# Patient Record
Sex: MALE | Race: ASIAN | NOT HISPANIC OR LATINO | URBAN - METROPOLITAN AREA
[De-identification: names, ages, dates, MRNs, and addresses within clinical notes are randomized per-mention and may not be internally consistent; named-entity substitution may affect disease eponyms.]

---

## 2023-07-08 ENCOUNTER — EMERGENCY (EMERGENCY)
Facility: HOSPITAL | Age: 43
LOS: 1 days | Discharge: HOME | End: 2023-07-08
Attending: EMERGENCY MEDICINE
Payer: COMMERCIAL

## 2023-07-08 VITALS
RESPIRATION RATE: 16 BRPM | TEMPERATURE: 98 F | WEIGHT: 190.04 LBS | OXYGEN SATURATION: 95 % | DIASTOLIC BLOOD PRESSURE: 60 MMHG | HEIGHT: 69 IN | HEART RATE: 81 BPM | SYSTOLIC BLOOD PRESSURE: 88 MMHG

## 2023-07-08 VITALS
OXYGEN SATURATION: 100 % | RESPIRATION RATE: 17 BRPM | DIASTOLIC BLOOD PRESSURE: 69 MMHG | SYSTOLIC BLOOD PRESSURE: 103 MMHG | HEART RATE: 68 BPM

## 2023-07-08 LAB
ALBUMIN SERPL ELPH-MCNC: 4.3 G/DL — SIGNIFICANT CHANGE UP (ref 3.3–5)
ALP SERPL-CCNC: 61 U/L — SIGNIFICANT CHANGE UP (ref 40–120)
ALT FLD-CCNC: 21 U/L — SIGNIFICANT CHANGE UP (ref 10–45)
ANION GAP SERPL CALC-SCNC: 15 MMOL/L — SIGNIFICANT CHANGE UP (ref 5–17)
APTT BLD: 26.5 SEC — LOW (ref 27.5–35.5)
AST SERPL-CCNC: 22 U/L — SIGNIFICANT CHANGE UP (ref 10–40)
BASE EXCESS BLDV CALC-SCNC: -0.9 MMOL/L — SIGNIFICANT CHANGE UP (ref -2–3)
BASOPHILS # BLD AUTO: 0.04 K/UL — SIGNIFICANT CHANGE UP (ref 0–0.2)
BASOPHILS NFR BLD AUTO: 0.8 % — SIGNIFICANT CHANGE UP (ref 0–2)
BILIRUB SERPL-MCNC: 0.8 MG/DL — SIGNIFICANT CHANGE UP (ref 0.2–1.2)
BUN SERPL-MCNC: 13 MG/DL — SIGNIFICANT CHANGE UP (ref 7–23)
CA-I SERPL-SCNC: 1.21 MMOL/L — SIGNIFICANT CHANGE UP (ref 1.15–1.33)
CALCIUM SERPL-MCNC: 9 MG/DL — SIGNIFICANT CHANGE UP (ref 8.4–10.5)
CHLORIDE BLDV-SCNC: 107 MMOL/L — SIGNIFICANT CHANGE UP (ref 96–108)
CHLORIDE SERPL-SCNC: 108 MMOL/L — SIGNIFICANT CHANGE UP (ref 96–108)
CO2 BLDV-SCNC: 27 MMOL/L — HIGH (ref 22–26)
CO2 SERPL-SCNC: 18 MMOL/L — LOW (ref 22–31)
CREAT SERPL-MCNC: 0.75 MG/DL — SIGNIFICANT CHANGE UP (ref 0.5–1.3)
EGFR: 116 ML/MIN/1.73M2 — SIGNIFICANT CHANGE UP
EOSINOPHIL # BLD AUTO: 0.14 K/UL — SIGNIFICANT CHANGE UP (ref 0–0.5)
EOSINOPHIL NFR BLD AUTO: 2.8 % — SIGNIFICANT CHANGE UP (ref 0–6)
ETHANOL SERPL-MCNC: <10 MG/DL — SIGNIFICANT CHANGE UP (ref 0–10)
GAS PNL BLDV: 137 MMOL/L — SIGNIFICANT CHANGE UP (ref 136–145)
GAS PNL BLDV: SIGNIFICANT CHANGE UP
GLUCOSE BLDV-MCNC: 106 MG/DL — HIGH (ref 70–99)
GLUCOSE SERPL-MCNC: 107 MG/DL — HIGH (ref 70–99)
HCO3 BLDV-SCNC: 25 MMOL/L — SIGNIFICANT CHANGE UP (ref 22–29)
HCT VFR BLD CALC: 40.6 % — SIGNIFICANT CHANGE UP (ref 39–50)
HCT VFR BLDA CALC: 44 % — SIGNIFICANT CHANGE UP (ref 39–51)
HGB BLD CALC-MCNC: 14.7 G/DL — SIGNIFICANT CHANGE UP (ref 12.6–17.4)
HGB BLD-MCNC: 14.1 G/DL — SIGNIFICANT CHANGE UP (ref 13–17)
IMM GRANULOCYTES NFR BLD AUTO: 0.4 % — SIGNIFICANT CHANGE UP (ref 0–0.9)
INR BLD: 1.03 RATIO — SIGNIFICANT CHANGE UP (ref 0.88–1.16)
LACTATE BLDV-MCNC: 1.1 MMOL/L — SIGNIFICANT CHANGE UP (ref 0.5–2)
LIDOCAIN IGE QN: 28 U/L — SIGNIFICANT CHANGE UP (ref 7–60)
LYMPHOCYTES # BLD AUTO: 2.05 K/UL — SIGNIFICANT CHANGE UP (ref 1–3.3)
LYMPHOCYTES # BLD AUTO: 40.8 % — SIGNIFICANT CHANGE UP (ref 13–44)
MCHC RBC-ENTMCNC: 32.4 PG — SIGNIFICANT CHANGE UP (ref 27–34)
MCHC RBC-ENTMCNC: 34.7 GM/DL — SIGNIFICANT CHANGE UP (ref 32–36)
MCV RBC AUTO: 93.3 FL — SIGNIFICANT CHANGE UP (ref 80–100)
MONOCYTES # BLD AUTO: 0.39 K/UL — SIGNIFICANT CHANGE UP (ref 0–0.9)
MONOCYTES NFR BLD AUTO: 7.8 % — SIGNIFICANT CHANGE UP (ref 2–14)
NEUTROPHILS # BLD AUTO: 2.38 K/UL — SIGNIFICANT CHANGE UP (ref 1.8–7.4)
NEUTROPHILS NFR BLD AUTO: 47.4 % — SIGNIFICANT CHANGE UP (ref 43–77)
NRBC # BLD: 0 /100 WBCS — SIGNIFICANT CHANGE UP (ref 0–0)
PCO2 BLDV: 47 MMHG — SIGNIFICANT CHANGE UP (ref 42–55)
PH BLDV: 7.34 — SIGNIFICANT CHANGE UP (ref 7.32–7.43)
PLATELET # BLD AUTO: 203 K/UL — SIGNIFICANT CHANGE UP (ref 150–400)
PO2 BLDV: 47 MMHG — HIGH (ref 25–45)
POTASSIUM BLDV-SCNC: 3.8 MMOL/L — SIGNIFICANT CHANGE UP (ref 3.5–5.1)
POTASSIUM SERPL-MCNC: 4.4 MMOL/L — SIGNIFICANT CHANGE UP (ref 3.5–5.3)
POTASSIUM SERPL-SCNC: 4.4 MMOL/L — SIGNIFICANT CHANGE UP (ref 3.5–5.3)
PROT SERPL-MCNC: 7 G/DL — SIGNIFICANT CHANGE UP (ref 6–8.3)
PROTHROM AB SERPL-ACNC: 11.9 SEC — SIGNIFICANT CHANGE UP (ref 10.5–13.4)
RBC # BLD: 4.35 M/UL — SIGNIFICANT CHANGE UP (ref 4.2–5.8)
RBC # FLD: 11.7 % — SIGNIFICANT CHANGE UP (ref 10.3–14.5)
SAO2 % BLDV: 74.6 % — SIGNIFICANT CHANGE UP (ref 67–88)
SODIUM SERPL-SCNC: 141 MMOL/L — SIGNIFICANT CHANGE UP (ref 135–145)
WBC # BLD: 5.02 K/UL — SIGNIFICANT CHANGE UP (ref 3.8–10.5)
WBC # FLD AUTO: 5.02 K/UL — SIGNIFICANT CHANGE UP (ref 3.8–10.5)

## 2023-07-08 PROCEDURE — 73610 X-RAY EXAM OF ANKLE: CPT | Mod: 26,RT,76

## 2023-07-08 PROCEDURE — 85025 COMPLETE CBC W/AUTO DIFF WBC: CPT

## 2023-07-08 PROCEDURE — 84132 ASSAY OF SERUM POTASSIUM: CPT

## 2023-07-08 PROCEDURE — 83605 ASSAY OF LACTIC ACID: CPT

## 2023-07-08 PROCEDURE — 73630 X-RAY EXAM OF FOOT: CPT

## 2023-07-08 PROCEDURE — 82330 ASSAY OF CALCIUM: CPT

## 2023-07-08 PROCEDURE — 80053 COMPREHEN METABOLIC PANEL: CPT

## 2023-07-08 PROCEDURE — 86850 RBC ANTIBODY SCREEN: CPT

## 2023-07-08 PROCEDURE — 80307 DRUG TEST PRSMV CHEM ANLYZR: CPT

## 2023-07-08 PROCEDURE — 85018 HEMOGLOBIN: CPT

## 2023-07-08 PROCEDURE — 71260 CT THORAX DX C+: CPT | Mod: MA

## 2023-07-08 PROCEDURE — 72131 CT LUMBAR SPINE W/O DYE: CPT | Mod: 26,MA

## 2023-07-08 PROCEDURE — 85014 HEMATOCRIT: CPT

## 2023-07-08 PROCEDURE — 86901 BLOOD TYPING SEROLOGIC RH(D): CPT

## 2023-07-08 PROCEDURE — 82947 ASSAY GLUCOSE BLOOD QUANT: CPT

## 2023-07-08 PROCEDURE — 73610 X-RAY EXAM OF ANKLE: CPT

## 2023-07-08 PROCEDURE — 72125 CT NECK SPINE W/O DYE: CPT | Mod: 26,MA

## 2023-07-08 PROCEDURE — 73630 X-RAY EXAM OF FOOT: CPT | Mod: 26,LT

## 2023-07-08 PROCEDURE — 36415 COLL VENOUS BLD VENIPUNCTURE: CPT

## 2023-07-08 PROCEDURE — 71260 CT THORAX DX C+: CPT | Mod: 26,MA

## 2023-07-08 PROCEDURE — 85730 THROMBOPLASTIN TIME PARTIAL: CPT

## 2023-07-08 PROCEDURE — 96375 TX/PRO/DX INJ NEW DRUG ADDON: CPT | Mod: XU

## 2023-07-08 PROCEDURE — 76705 ECHO EXAM OF ABDOMEN: CPT

## 2023-07-08 PROCEDURE — 70450 CT HEAD/BRAIN W/O DYE: CPT | Mod: 26,MA

## 2023-07-08 PROCEDURE — 74177 CT ABD & PELVIS W/CONTRAST: CPT | Mod: MA

## 2023-07-08 PROCEDURE — 85610 PROTHROMBIN TIME: CPT

## 2023-07-08 PROCEDURE — 84295 ASSAY OF SERUM SODIUM: CPT

## 2023-07-08 PROCEDURE — 99285 EMERGENCY DEPT VISIT HI MDM: CPT

## 2023-07-08 PROCEDURE — 99284 EMERGENCY DEPT VISIT MOD MDM: CPT | Mod: 25

## 2023-07-08 PROCEDURE — 82803 BLOOD GASES ANY COMBINATION: CPT

## 2023-07-08 PROCEDURE — 72125 CT NECK SPINE W/O DYE: CPT | Mod: MA

## 2023-07-08 PROCEDURE — 86900 BLOOD TYPING SEROLOGIC ABO: CPT

## 2023-07-08 PROCEDURE — 83690 ASSAY OF LIPASE: CPT

## 2023-07-08 PROCEDURE — 96374 THER/PROPH/DIAG INJ IV PUSH: CPT | Mod: XU

## 2023-07-08 PROCEDURE — 70450 CT HEAD/BRAIN W/O DYE: CPT | Mod: MA

## 2023-07-08 PROCEDURE — 74177 CT ABD & PELVIS W/CONTRAST: CPT | Mod: 26,MA

## 2023-07-08 PROCEDURE — 82435 ASSAY OF BLOOD CHLORIDE: CPT

## 2023-07-08 RX ORDER — LIDOCAINE 4 G/100G
1 CREAM TOPICAL ONCE
Refills: 0 | Status: COMPLETED | OUTPATIENT
Start: 2023-07-08 | End: 2023-07-08

## 2023-07-08 RX ORDER — SODIUM CHLORIDE 9 MG/ML
250 INJECTION INTRAMUSCULAR; INTRAVENOUS; SUBCUTANEOUS ONCE
Refills: 0 | Status: COMPLETED | OUTPATIENT
Start: 2023-07-08 | End: 2023-07-08

## 2023-07-08 RX ORDER — KETOROLAC TROMETHAMINE 30 MG/ML
15 SYRINGE (ML) INJECTION ONCE
Refills: 0 | Status: DISCONTINUED | OUTPATIENT
Start: 2023-07-08 | End: 2023-07-08

## 2023-07-08 RX ORDER — ACETAMINOPHEN 500 MG
1000 TABLET ORAL ONCE
Refills: 0 | Status: COMPLETED | OUTPATIENT
Start: 2023-07-08 | End: 2023-07-08

## 2023-07-08 RX ADMIN — SODIUM CHLORIDE 250 MILLILITER(S): 9 INJECTION INTRAMUSCULAR; INTRAVENOUS; SUBCUTANEOUS at 07:19

## 2023-07-08 RX ADMIN — Medication 15 MILLIGRAM(S): at 08:07

## 2023-07-08 RX ADMIN — Medication 400 MILLIGRAM(S): at 08:07

## 2023-07-08 RX ADMIN — LIDOCAINE 1 PATCH: 4 CREAM TOPICAL at 08:07

## 2023-07-08 NOTE — ED ADULT NURSE REASSESSMENT NOTE - NS ED NURSE REASSESS COMMENT FT1
Pt AAOx4, NAD, resting comfortably in bed with spouse at bedside. Pt denies headache, dizziness, chest pain, cough, SOB, abdominal pain, n/v/d, urinary symptoms, fevers, chills, weakness at this time. Pt discharged as per MD, IV removed as per MD Subramanian, pt ambulated independently out of ED.

## 2023-07-08 NOTE — ED ADULT NURSE REASSESSMENT NOTE - NS ED NURSE REASSESS COMMENT FT1
Report received from Camilo. Pt AAOx4, NAD, resp nonlabored, skin warm/dry, resting comfortably in bed. Pt c/o back pain. Pt denies headache, dizziness, chest pain, palpitations, SOB, abd pain, n/v/d, urinary symptoms, fevers, chills, weakness at this time. Pt awaiting CT results. Safety maintained.

## 2023-07-08 NOTE — ED PROVIDER NOTE - NSFOLLOWUPINSTRUCTIONS_ED_ALL_ED_FT
You were seen in the Emergency Department for fall.     1) Advance activity as tolerated.   2) Continue all previously prescribed medications as directed. Take acetaminophen 975mg and ibuprofen 400mg every 6-8 hours as needed for pain.   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    4) Return to the Emergency Department for worsening or persistent symptoms, and/or ANY NEW OR CONCERNING SYMPTOMS.    SEEK IMMEDIATE MEDICAL CARE IF YOU HAVE ANY OF THE FOLLOWING SYMPTOMS: numbness, tingling, increasing pain, or weakness in any part of the injured limb.

## 2023-07-08 NOTE — ED ADULT NURSE NOTE - OBJECTIVE STATEMENT
43 yo M AOx4 from home with no pertinent PMH c/o left heel and back pain. Pt states he slipped and fell off a 6 and a half foot ladder and landed on a wooden floor. Pt unsure what body part he landed on, but denies LOC and confirms ability to get up by self "with a lot of pain". Pt endorses use of 2 Tylenol that provided no relief. Pt endorses dizziness and nausea this am. Pt PERRL. Neuro exam intact with PMS and full ROM x 4 extremities. No slurred speech and facial droop noted. Pt initially presents to [facility] ED in no acute distress with unlabored breathing. Pt abdomen soft and nondistended. Call bell in reach. Stretcher in lowest position locked with blanket given. Comfort care and safety measures provided. Pt denies c/p, sob, abd pain, N/V/D, fevers, chills, dysuria, blood in urine and stool. 41 yo M AOx4 from home with no pertinent PMH c/o left heel and back pain. Pt states he slipped and fell off a 6 and a half foot ladder and landed on a wooden floor. Pt unsure what body part he landed on, but denies LOC and confirms ability to get up by self "with a lot of pain". Pt endorses use of 2 Tylenol that provided no relief. Pt endorses dizziness and nausea "felt like I needed to vomit but didn't" this am. Pt PERRL. Neuro exam intact with PMS and full ROM x 4 extremities. No slurred speech and facial droop noted. Pt initially presents to [facility] ED in no acute distress with unlabored breathing. Pt abdomen soft and nondistended. Call bell in reach. Stretcher in lowest position locked with blanket given. Comfort care and safety measures provided. Pt denies c/p, sob, abd pain, V/D, fevers, chills, dysuria, blood in urine and stool.

## 2023-07-08 NOTE — ED PROVIDER NOTE - PATIENT PORTAL LINK FT
You can access the FollowMyHealth Patient Portal offered by [facility] by registering at the following website: [email or web address]. By joining [facility]’s FollowMyHealth portal, you will also be able to view your health information using other applications (apps) compatible with our system.

## 2023-07-08 NOTE — ED ADULT NURSE NOTE - NSFALLUNIVINTERV_ED_ALL_ED
Bed/Stretcher in lowest position, wheels locked, appropriate side rails in place/Call bell, personal items and telephone in reach/Instruct patient to call for assistance before getting out of bed/chair/stretcher/Non-slip footwear applied when patient is off stretcher/[place] to call system/Physically safe environment - no spills, clutter or unnecessary equipment/Purposeful proactive rounding/Room/bathroom lighting operational, light cord in reach

## 2023-07-08 NOTE — ED PROVIDER NOTE - CARE PLAN
1 Principal Discharge DX:	Back pain   Principal Discharge DX:	Back pain  Secondary Diagnosis:	Foot pain, left

## 2023-07-08 NOTE — ED ADULT TRIAGE NOTE - NS ED TRIAGE AVPU SCALE
Normal for race
Alert-The patient is alert, awake and responds to voice. The patient is oriented to time, place, and person. The triage nurse is able to obtain subjective information.

## 2023-07-08 NOTE — ED PROVIDER NOTE - PROGRESS NOTE DETAILS
Moris: Patient signed out to f/u imaging, xray, pain control. Patient still complainig of pain to lumbar spine. CTs done and negative for intrathoracic/abdominal injuries. will order ct lumbar recon.  will give tylenol/toradol for pain, reassess. Patient ambulated without difficulty.  Placed Ace wrap for comfort on foot. Improvement on symptoms. Informed patient of incidental finding of Paget Schroetter syndrome seen on CT and informed patient to follow-up with vascular surgery   outpatient for findings.  Spoke to patient/family about results including other incidental findings. Plan to discharge patient. Patient given  vascular surgery and PCP follow up and return precautions. Patient/family agrees with plan.

## 2023-07-08 NOTE — ED PROVIDER NOTE - ATTENDING CONTRIBUTION TO CARE
Dr. Asencio (Attending Physician)  I performed a history and physical exam of the patient and discussed their management with the resident. I reviewed the resident's note and agree with the documented findings and plan of care. My medical decision making and observations are found above.

## 2023-07-08 NOTE — ED PROVIDER NOTE - CLINICAL SUMMARY MEDICAL DECISION MAKING FREE TEXT BOX
Cordelia Wyatt DO PGY-3  42-year-old male with no past past medical history, not on anticoagulation presents to the ED after a fall off of a ladder that was 6 feet tall.  Patient had a mechanical slip and fall at 7 PM yesterday where he fell backwards and landed on his left foot.  States that he had some immediate pain was able to ambulate, but gradually started experiencing severe back pain and left heel pain.  Denies numbness, tingling, weakness.  Denies neck pain, loss of consciousness or headache.  Pt initially hypotensive to 90s/60s which immediately responded to IVF. GCS 15, Abcde's intact, E-FAST negative. No midline spinal tenderness or step-offs except for the L3-L4 region midline and paraspinally  Where he has some pain with palpation.  Patient is neurovascularly intact.  Will evaluate for fractures, RP bleed. Plan to obtain CTs, XRs, labs, re-assess. Cordelia Wyatt DO PGY-3  42-year-old male with no past past medical history, not on anticoagulation presents to the ED after a fall off of a ladder that was 6 feet tall.  Patient had a mechanical slip and fall at 7 PM yesterday where he fell backwards and landed on his left foot.  States that he had some immediate pain was able to ambulate, but gradually started experiencing severe back pain and left heel pain.  Denies numbness, tingling, weakness.  Denies neck pain, loss of consciousness or headache.  Pt initially hypotensive to 90s/60s which immediately responded to IVF. GCS 15, Abcde's intact, E-FAST negative. No midline spinal tenderness or step-offs except for the L3-L4 region midline and paraspinally  Where he has some pain with palpation.  Patient is neurovascularly intact.  Will evaluate for fractures, RP bleed. Plan to obtain CTs, XRs, labs, re-assess.    Dr. Asencio (Attending Physician)

## 2023-07-10 PROCEDURE — 76705 ECHO EXAM OF ABDOMEN: CPT | Mod: 26

## 2024-09-07 ENCOUNTER — APPOINTMENT (OUTPATIENT)
Dept: INTERNAL MEDICINE | Facility: CLINIC | Age: 44
End: 2024-09-07
Payer: COMMERCIAL

## 2024-09-07 VITALS
TEMPERATURE: 97.2 F | OXYGEN SATURATION: 98 % | HEART RATE: 77 BPM | DIASTOLIC BLOOD PRESSURE: 69 MMHG | HEIGHT: 68 IN | WEIGHT: 162 LBS | BODY MASS INDEX: 24.55 KG/M2 | SYSTOLIC BLOOD PRESSURE: 103 MMHG

## 2024-09-07 DIAGNOSIS — Z80.3 FAMILY HISTORY OF MALIGNANT NEOPLASM OF BREAST: ICD-10-CM

## 2024-09-07 DIAGNOSIS — Z11.59 ENCOUNTER FOR SCREENING FOR OTHER VIRAL DISEASES: ICD-10-CM

## 2024-09-07 DIAGNOSIS — Z00.00 ENCOUNTER FOR GENERAL ADULT MEDICAL EXAMINATION W/OUT ABNORMAL FINDINGS: ICD-10-CM

## 2024-09-07 DIAGNOSIS — Z11.4 ENCOUNTER FOR SCREENING FOR HUMAN IMMUNODEFICIENCY VIRUS [HIV]: ICD-10-CM

## 2024-09-07 PROCEDURE — 93000 ELECTROCARDIOGRAM COMPLETE: CPT

## 2024-09-07 PROCEDURE — 99386 PREV VISIT NEW AGE 40-64: CPT

## 2024-09-07 PROCEDURE — 36415 COLL VENOUS BLD VENIPUNCTURE: CPT

## 2024-09-07 NOTE — HISTORY OF PRESENT ILLNESS
[FreeTextEntry1] : CPE as new pt evaluation [de-identified] : PMH fatty liver PSH sinus surgery during childhood No significant complaints.

## 2024-09-07 NOTE — PHYSICAL EXAM
[No Acute Distress] : no acute distress [Well Nourished] : well nourished [Well Developed] : well developed [Well-Appearing] : well-appearing [Normal Sclera/Conjunctiva] : normal sclera/conjunctiva [PERRL] : pupils equal round and reactive to light [EOMI] : extraocular movements intact [Normal Outer Ear/Nose] : the outer ears and nose were normal in appearance [No JVD] : no jugular venous distention [No Lymphadenopathy] : no lymphadenopathy [Supple] : supple [Thyroid Normal, No Nodules] : the thyroid was normal and there were no nodules present [No Respiratory Distress] : no respiratory distress  [No Accessory Muscle Use] : no accessory muscle use [Clear to Auscultation] : lungs were clear to auscultation bilaterally [Normal Rate] : normal rate  [Regular Rhythm] : with a regular rhythm [Normal S1, S2] : normal S1 and S2 [No Murmur] : no murmur heard [No Edema] : there was no peripheral edema [Soft] : abdomen soft [No Extremity Clubbing/Cyanosis] : no extremity clubbing/cyanosis [Non Tender] : non-tender [Non-distended] : non-distended [No Masses] : no abdominal mass palpated [Normal Anterior Cervical Nodes] : no anterior cervical lymphadenopathy [Grossly Normal Strength/Tone] : grossly normal strength/tone [Coordination Grossly Intact] : coordination grossly intact [Normal Gait] : normal gait [Alert and Oriented x3] : oriented to person, place, and time [Normal Affect] : the affect was normal [Normal Insight/Judgement] : insight and judgment were intact

## 2024-09-07 NOTE — PLAN
[FreeTextEntry1] :  HCM )  follow w/ dental, ophthalmology/optometry as recommended ophthalmology referral given to pt EKG done for HCM at this office today;  NSR no acute ST-T changes Lab ( venipuncture ) done today at this office Diet low calorie low fat regular exercises recommended hep B/C screening  fatigue check lab, observe clinically  observe closely clinically and RTC if persistent or worsening S/S  Frequent loose stools, possibly IBS, S/P neg colonoscopies in [place]  observe closely clinically and RTC if persistent or worsening S/S, will refer to colonoscopy

## 2024-09-07 NOTE — REVIEW OF SYSTEMS
[Fatigue] : fatigue [Negative] : Heme/Lymph [FreeTextEntry7] : chronic frequent loose stools, S/P multiple neg colonoscopy in [place]

## 2024-09-07 NOTE — HEALTH RISK ASSESSMENT
[Very Good] : ~his/her~  mood as very good [Yes] : Yes [Monthly or less (1 pt)] : Monthly or less (1 point) [1 or 2 (0 pts)] : 1 or 2 (0 points) [No] : In the past 12 months have you used drugs other than those required for medical reasons? No [No falls in past year] : Patient reported no falls in the past year [0] : 2) Feeling down, depressed, or hopeless: Not at all (0) [PHQ-2 Negative - No further assessment needed] : PHQ-2 Negative - No further assessment needed [Never] : Never [Patient reported colonoscopy was normal] : Patient reported colonoscopy was normal [HIV test declined] : HIV test declined [Hepatitis C test offered] : Hepatitis C test offered [None] : None [With Family] : lives with family [Employed] : employed [] :  [# Of Children ___] : has [unfilled] children [Feels Safe at Home] : Feels safe at home [Fully functional (bathing, dressing, toileting, transferring, walking, feeding)] : Fully functional (bathing, dressing, toileting, transferring, walking, feeding) [Fully functional (using the telephone, shopping, preparing meals, housekeeping, doing laundry, using] : Fully functional and needs no help or supervision to perform IADLs (using the telephone, shopping, preparing meals, housekeeping, doing laundry, using transportation, managing medications and managing finances) [Reports normal functional visual acuity (ie: able to read med bottle)] : Reports normal functional visual acuity [Seat Belt] :  uses seat belt [de-identified] : sedentary [de-identified] : regular [[id number]] : 0 [EyeExamDate] : ? [Change in mental status noted] : No change in mental status noted [Language] : denies difficulty with language [Behavior] : denies difficulty with behavior [High Risk Behavior] : no high risk behavior [Reports changes in hearing] : Reports no changes in hearing [Reports changes in vision] : Reports no changes in vision [Reports changes in dental health] : Reports no changes in dental health [ColonoscopyDate] : 2021

## 2024-09-10 DIAGNOSIS — R80.9 PROTEINURIA, UNSPECIFIED: ICD-10-CM

## 2024-09-10 LAB
ALBUMIN SERPL ELPH-MCNC: 4.8 G/DL
ALP BLD-CCNC: 67 U/L
ALT SERPL-CCNC: 21 U/L
ANION GAP SERPL CALC-SCNC: 10 MMOL/L
APPEARANCE: CLEAR
AST SERPL-CCNC: 21 U/L
BACTERIA: NEGATIVE /HPF
BILIRUB SERPL-MCNC: 0.6 MG/DL
BILIRUBIN URINE: NEGATIVE
BLOOD URINE: NEGATIVE
BUN SERPL-MCNC: 9 MG/DL
CALCIUM SERPL-MCNC: 9.7 MG/DL
CAST: 1 /LPF
CHLORIDE SERPL-SCNC: 105 MMOL/L
CHOLEST SERPL-MCNC: 174 MG/DL
CO2 SERPL-SCNC: 25 MMOL/L
COLOR: YELLOW
CREAT SERPL-MCNC: 0.9 MG/DL
EGFR: 109 ML/MIN/1.73M2
EPITHELIAL CELLS: 0 /HPF
GLUCOSE QUALITATIVE U: NEGATIVE MG/DL
GLUCOSE SERPL-MCNC: 91 MG/DL
HBV SURFACE AG SER QL: NONREACTIVE
HCT VFR BLD CALC: 43 %
HCV AB SER QL: NONREACTIVE
HCV S/CO RATIO: 0.47 S/CO
HDLC SERPL-MCNC: 40 MG/DL
HGB BLD-MCNC: 14.7 G/DL
KETONES URINE: NEGATIVE MG/DL
LDLC SERPL CALC-MCNC: 117 MG/DL
LEUKOCYTE ESTERASE URINE: NEGATIVE
MCHC RBC-ENTMCNC: 32.3 PG
MCHC RBC-ENTMCNC: 34.2 GM/DL
MCV RBC AUTO: 94.5 FL
MICROSCOPIC-UA: NORMAL
NITRITE URINE: NEGATIVE
NONHDLC SERPL-MCNC: 134 MG/DL
PH URINE: 7
PLATELET # BLD AUTO: 246 K/UL
POTASSIUM SERPL-SCNC: 4.5 MMOL/L
PROT SERPL-MCNC: 7.7 G/DL
PROTEIN URINE: 100 MG/DL
RBC # BLD: 4.55 M/UL
RBC # FLD: 11.9 %
RED BLOOD CELLS URINE: 1 /HPF
SODIUM SERPL-SCNC: 140 MMOL/L
SPECIFIC GRAVITY URINE: 1.02
TRIGL SERPL-MCNC: 96 MG/DL
TSH SERPL-ACNC: 1.23 UIU/ML
UROBILINOGEN URINE: 0.2 MG/DL
WBC # FLD AUTO: 4.02 K/UL
WHITE BLOOD CELLS URINE: 0 /HPF

## 2024-09-17 LAB
APPEARANCE: CLEAR
BACTERIA: NEGATIVE /HPF
BILIRUBIN URINE: NEGATIVE
BLOOD URINE: NEGATIVE
CAST: 4 /LPF
COLOR: YELLOW
CREAT SPEC-SCNC: 169 MG/DL
CREAT SPEC-SCNC: 169 MG/DL
CREAT/PROT UR: 0.1 RATIO
EPITHELIAL CELLS: 0 /HPF
GLUCOSE QUALITATIVE U: NEGATIVE MG/DL
KETONES URINE: ABNORMAL MG/DL
LEUKOCYTE ESTERASE URINE: NEGATIVE
MICROALBUMIN 24H UR DL<=1MG/L-MCNC: <1.2 MG/DL
MICROALBUMIN/CREAT 24H UR-RTO: NORMAL MG/G
MICROSCOPIC-UA: NORMAL
NITRITE URINE: NEGATIVE
PH URINE: 6.5
PROT UR-MCNC: 23 MG/DL
PROTEIN URINE: 30 MG/DL
RED BLOOD CELLS URINE: 1 /HPF
SPECIFIC GRAVITY URINE: 1.02
UROBILINOGEN URINE: 0.2 MG/DL
WHITE BLOOD CELLS URINE: 0 /HPF

## 2024-09-21 ENCOUNTER — TRANSCRIPTION ENCOUNTER (OUTPATIENT)
Age: 44
End: 2024-09-21

## 2025-08-11 ENCOUNTER — APPOINTMENT (OUTPATIENT)
Dept: OPHTHALMOLOGY | Facility: CLINIC | Age: 45
End: 2025-08-11